# Patient Record
Sex: FEMALE | Race: WHITE
[De-identification: names, ages, dates, MRNs, and addresses within clinical notes are randomized per-mention and may not be internally consistent; named-entity substitution may affect disease eponyms.]

---

## 2019-01-25 ENCOUNTER — HOSPITAL ENCOUNTER (INPATIENT)
Dept: HOSPITAL 92 - SURG A | Age: 84
LOS: 6 days | Discharge: NURSING, REHAB OR LONG-TERM CARE | DRG: 465 | End: 2019-01-31
Attending: ORTHOPAEDIC SURGERY | Admitting: ORTHOPAEDIC SURGERY
Payer: MEDICARE

## 2019-01-25 ENCOUNTER — HOSPITAL ENCOUNTER (OUTPATIENT)
Dept: HOSPITAL 92 - LABBT | Age: 84
Discharge: HOME | End: 2019-01-25
Attending: ORTHOPAEDIC SURGERY
Payer: MEDICARE

## 2019-01-25 VITALS — BODY MASS INDEX: 20 KG/M2

## 2019-01-25 DIAGNOSIS — D53.9: ICD-10-CM

## 2019-01-25 DIAGNOSIS — Z90.10: ICD-10-CM

## 2019-01-25 DIAGNOSIS — I25.10: ICD-10-CM

## 2019-01-25 DIAGNOSIS — T84.52XD: ICD-10-CM

## 2019-01-25 DIAGNOSIS — K21.9: ICD-10-CM

## 2019-01-25 DIAGNOSIS — I48.91: ICD-10-CM

## 2019-01-25 DIAGNOSIS — M06.9: ICD-10-CM

## 2019-01-25 DIAGNOSIS — Z01.812: Primary | ICD-10-CM

## 2019-01-25 DIAGNOSIS — E78.5: ICD-10-CM

## 2019-01-25 DIAGNOSIS — T84.52XA: Primary | ICD-10-CM

## 2019-01-25 DIAGNOSIS — Z85.3: ICD-10-CM

## 2019-01-25 DIAGNOSIS — I10: ICD-10-CM

## 2019-01-25 DIAGNOSIS — F03.90: ICD-10-CM

## 2019-01-25 DIAGNOSIS — Z86.73: ICD-10-CM

## 2019-01-25 DIAGNOSIS — Z74.01: ICD-10-CM

## 2019-01-25 DIAGNOSIS — Z99.3: ICD-10-CM

## 2019-01-25 PROCEDURE — 87076 CULTURE ANAEROBE IDENT EACH: CPT

## 2019-01-25 PROCEDURE — 87077 CULTURE AEROBIC IDENTIFY: CPT

## 2019-01-25 PROCEDURE — 82607 VITAMIN B-12: CPT

## 2019-01-25 PROCEDURE — 86850 RBC ANTIBODY SCREEN: CPT

## 2019-01-25 PROCEDURE — 87070 CULTURE OTHR SPECIMN AEROBIC: CPT

## 2019-01-25 PROCEDURE — C1751 CATH, INF, PER/CENT/MIDLINE: HCPCS

## 2019-01-25 PROCEDURE — P9016 RBC LEUKOCYTES REDUCED: HCPCS

## 2019-01-25 PROCEDURE — 85027 COMPLETE CBC AUTOMATED: CPT

## 2019-01-25 PROCEDURE — 82746 ASSAY OF FOLIC ACID SERUM: CPT

## 2019-01-25 PROCEDURE — 87205 SMEAR GRAM STAIN: CPT

## 2019-01-25 PROCEDURE — 86901 BLOOD TYPING SEROLOGIC RH(D): CPT

## 2019-01-25 PROCEDURE — 80202 ASSAY OF VANCOMYCIN: CPT

## 2019-01-25 PROCEDURE — 86900 BLOOD TYPING SEROLOGIC ABO: CPT

## 2019-01-25 PROCEDURE — 87186 SC STD MICRODIL/AGAR DIL: CPT

## 2019-01-25 PROCEDURE — 36430 TRANSFUSION BLD/BLD COMPNT: CPT

## 2019-01-25 PROCEDURE — 36415 COLL VENOUS BLD VENIPUNCTURE: CPT

## 2019-01-25 PROCEDURE — 36569 INSJ PICC 5 YR+ W/O IMAGING: CPT

## 2019-01-25 NOTE — HP
SCHEDULED DATE OF ADMISSION:  01/28/2019.



HISTORY OF PRESENT ILLNESS:  The patient is an 83-year-old female, who has underwent

hemiarthroplasty of the left hip following a fracture in [place], [place] in July 2018.

 She initially did well.  She subsequently was placed into a memory care facility

because of some dementia.  Apparently, she had wound breakdown with a draining

sinus.  She was seen at [facility] in [place] and apparently had MRSA and was

treated with a brief course of some oral antibiotics.  She subsequently was

transferred to [facility] as her daughter lives here.  She has continued her

local wound care and packing changes, but continues to have pain and drainage from

her left hip.  She has not been febrile or septic.  The patient does have dementia

and has been gradually deteriorating, although according to her daughter, she

usually does recognize her and does have lucid moments, but sometimes is confused.

Since her surgery, she has really not been ambulatory.  She spends most time in bed

or in a wheelchair, although on one or two occasions, she has attempted standing

parallel bars, but essentially, has been bedridden and wheelchair bound over the

past couple of months. 



PAST MEDICAL HISTORY:  As noted above.  The patient has history of rheumatoid

arthritis and has been on low-dose prednisone 5 mg daily.  She also has history of

hypertension, previous breast cancer, atherosclerotic cardiovascular disease. 



CURRENT MEDICATIONS:  Include risperidone, Tylenol, calcium, prednisone, and Colace.



ALLERGIES:  SHE HAS NO KNOWN ALLERGIES.



FAMILY HISTORY:  Otherwise unremarkable.



SOCIAL HISTORY:  Otherwise unremarkable.



REVIEW OF SYSTEMS:  Otherwise unremarkable.



PHYSICAL EXAMINATION:

GENERAL:  Reveals an elderly chronically ill female, who is moderately demented and

will respond occasionally appropriately.  She is afebrile. 

HEENT:  Unremarkable. 

NECK:  Supple. 

CHEST:  Clear. 

HEART:  Regular rate and rhythm. 

ABDOMEN:  Soft, nontender. 

PELVIC:  Deferred. 

RECTAL:  Deferred. 

BREASTS:  Deferred. 

EXTREMITIES:  Pertinent findings of the left hip, the left leg is approximately 2 cm

longer.  There is a healed lateral incision and there is a 2 cm open wound posterior

to this with seropurulent drainage, which is foul smelling with some surrounding

erythema.  There is restricted range of motion and mild pain with range of motion. 

NEUROVASCULAR:  Intact.



DIAGNOSTIC DATA:  X-rays of the left hip reveal an uncemented hemiarthroplasty with

no definite signs of osteomyelitis or vascular calcifications.  The soft tissue

defect and draining sinus area appears to communicate to at least a greater

trochanter. 



IMPRESSION:  Infected hemiarthroplasty of left hip.



PLAN:  Cultures have been obtained from my office are pending.  We will plan

appropriate antibiotic treatment once these are finalized.  We will plan incision

and drainage with the removal of the hemiarthroplasty conversion to a Girdlestone

resection arthroplasty.  I think her age and overall medical status precludes

attempted revision or secondary reimplantation of a new right hip replacement.  The

nature of the surgery, length, recovery, potential complications such as persistent

infection, incomplete relief, delayed wound healing, neurovascular injury,

thromboembolic phenomena, possible transfusion, fractured femur, myocardial

infarction, and death have been discussed in detail with the patient and her

daughter.  Her long-term prognosis is certainly guarded. 







Job ID:  [id number]

## 2019-01-28 PROCEDURE — 30233N1 TRANSFUSION OF NONAUTOLOGOUS RED BLOOD CELLS INTO PERIPHERAL VEIN, PERCUTANEOUS APPROACH: ICD-10-PCS | Performed by: ORTHOPAEDIC SURGERY

## 2019-01-28 PROCEDURE — 0SPB0JZ REMOVAL OF SYNTHETIC SUBSTITUTE FROM LEFT HIP JOINT, OPEN APPROACH: ICD-10-PCS | Performed by: ORTHOPAEDIC SURGERY

## 2019-01-28 PROCEDURE — 0JBM0ZZ EXCISION OF LEFT UPPER LEG SUBCUTANEOUS TISSUE AND FASCIA, OPEN APPROACH: ICD-10-PCS | Performed by: ORTHOPAEDIC SURGERY

## 2019-01-28 RX ADMIN — ASPIRIN 81 MG CHEWABLE TABLET SCH MG: 81 TABLET CHEWABLE at 20:21

## 2019-01-28 RX ADMIN — DOCUSATE SODIUM 50 MG AND SENNOSIDES 8.6 MG SCH TAB: 8.6; 5 TABLET, FILM COATED ORAL at 20:21

## 2019-01-28 NOTE — CON
DATE OF CONSULTATION:  01/28/2019



HISTORY OF PRESENT ILLNESS:  The patient has a left hip arthroplasty infection and

the patient had a fracture in the recent past in July and underwent a

hemiarthroplasty and developed chronic drainage since.  Eventually was transferred

to the [facility] here and has developed worsening cognitive issues related to dementia.

Because of persistence of drainage, Dr. Gerber was consulted and he has just removed

her implant and the patient will remain without an implant.  She is not ambulatory

and has very little cognitive function.  No reported headaches or respiratory

symptoms.  She has a Douglas catheter inserted for the surgery. 



ALLERGIES:  NONE.



FAMILY HISTORY:  Noncontributory.



SOCIAL HISTORY:  Never a smoker.



CURRENT MEDICATIONS:  Include,

1. Aspirin.

2. Cefepime.

3. Vancomycin.



PHYSICAL EXAMINATION:

VITAL SIGNS:  T-max 98.3, blood pressure 130/70, pulse at 94, respirations 16. 

GENERAL:  Appears in no distress.  She has no areas of skin breakdown except for the

surgical site, which does not appear different than usual postop appearance.  The

patient has a peripheral IV access. 

HEENT:  No lymphadenopathy.  Ocular movements, conjugate.  Oral cavity, normal.  She

still has quite a few teeth in place with some dental decay and gum disease. 

NECK:  Supple.  No jugular vein distention. 

LUNGS:  Symmetric, clear breath sounds. 

HEART:  S1, S2.  Regular rate.  No S3 or S4. 

ABDOMEN:  Soft, not distended or tender.  No bladder distention. 

EXTREMITIES:  No joint inflammatory activity.  She is able to move extremities on

command.  Plantar responses are flexor.  Pulses are 1+ in dorsalis pedis.  Trace

edema, lower extremities. 



LABORATORY DATA:  Latest labs from the 24th with white cell count 8.5, hemoglobin

9.3, platelets 386.  Sodium 137, creatinine 0.64.  Ferritin 74.  Liver profile

normal, CRP 7.09, albumin 3.3.  Urinalysis, essentially normal.  Microbiology

results, we have pending 4 samples with moderate wbc's, but no organisms seen.  In

one sample, there were gram-positive cocci in pairs and clusters and also moderate

gram-negative rods as well as some gram-positive rods.  This is from the superficial

abscess pocket, probably there is some contamination there.  The deep samples, one

of them showed gram-positive cocci in pairs and clusters and gram-negative rods. 



ASSESSMENT:  

1. Dementia.

2. Infected hemiarthroplasty following fracture.  The procedure was done elsewhere.

This is a chronically infected wound and the implant has been removed. 



DISCUSSION:  The patient will have IV antimicrobial therapy administered for the

usual duration of 4-6 weeks according to the organisms isolated and susceptibility.

We will try to choose a single-day administration regimen which would be

administered at the [facility] with weekly lab monitoring.  The patient will be at risk

for removing the PICC line because of her cognitive dysfunction.  We will have to

take measures to protect her IV access during the treatment phase.  After that, then

she should be considered cured and no suppressive therapy would be necessary. 







Job ID:  [id number]

## 2019-01-28 NOTE — OP
DATE OF PROCEDURE:  01/28/2019



ASSISTANT:  Curt Murcia MD



ANESTHESIA:  General.



PREOPERATIVE DIAGNOSIS:  Infected hemiarthroplasty, left hip.



POSTOPERATIVE DIAGNOSIS:  Infected hemiarthroplasty, left hip.



PROCEDURE PERFORMED:  Incision and drainage of left hip with removal of infected

hemiarthroplasty (Girdlestone procedure). 



OPERATIVE FINDINGS:  There was a chronic soft tissue abscess superficial to the IT

band, which extended deep to the IT band.  This appeared into the joint.  Most of

the chronic infection appeared to be superficial to this, but there was necrotic

tissue within the joint as well and based on her clinical course, I had to assume

that this was truly infected and elected to remove the hemiarthroplasty and attempt

to get the infection under control.  The stem of the prosthesis was well-fixed

necessitating an osteotomy of the femur to extricate the stem.  There was some

necrotic tissue within the femoral canal, but no gross pus.  Multiple superficial

and deep cultures were obtained prior to beginning antibiotics. 



DESCRIPTION OF PROCEDURE:  After satisfactory anesthesia was induced in supine

position, sequential compression devices were placed on the nonoperative leg

throughout the procedure.  The patient was placed in a lateral decubitus position.

This position held with a bean bag.  She was then prepped and draped in routine

sterile fashion.  The previous lateral incision was reopened in line with the

previous incision, and the posterior draining sinus wound was excised.  Cultures

were taken from the superficial to IT band.  IT band and gluteal fascia were split

in line with the skin incision.  A direct lateral approach to the hip prominence was

accomplished by dividing the anterior third of the gluteus medius and minimus

tendons with Bovie cautery reflecting this as a single flap anteriorly and medially.

 There were abundant calcifications around the capsule of the hip that also would be

consistent with chronic infection.  Deep cultures were obtained from the hip joint.

Hip was dislocated anteriorly.  The bipolar component was removed from the trunnion

of the prosthesis without too much difficulty.  I tried to extricate the stem with a

stem removal device as well as with flexible osteotomes, but cannot get the

prosthesis to move.  I elected to perform an osteotomy of the anterior aspect of the

femur.  Attempt to open this was open bulk, but it did break off the anterior

cortex.  This was excised __________ potential dead bone and infected wound.  After

this was removed, I was able to get flexible osteotomes around the portion of the

prosthesis, removed without too much difficulty.  Wounds were then copiously

irrigated with pulsatile lavage.  All necrotic tissue was sharply excised.  The

abductors were then repaired loosely with interrupted #1 PDS.  IT band and gluteal

fascia were loosely approximated with interrupted #1 PDS.  Subcutaneous tissues were

loosely approximated with 2-0 PDS.  The central necrotic area that may have been

draining and debrided was left open.  The skin was loosely approximated with

interrupted 3-0 nylon.  Saline-soaked sponges were inserted into the open portion of

the wound.  The remaining wound was sterilely dressed, and the patient turned to

supine position, awakened, and taken to recovery room in stable condition.  After

obtaining the wound cultures, she was started on IV Ancef and vancomycin.  The

estimated blood loss was approximately 350 mL.  She received 1 unit of packed cells

intraoperatively.  Her prognosis is guarded.  We will obtain Infectious Disease

consultation for appropriate antibiotics. 







Job ID:  [id number]

## 2019-01-28 NOTE — RAD
LEFT HIP TWO VIEWS:

 

History: Post op. 

 

Comparison: None. 

 

FINDINGS: 

Patient is post hardware removal. The femoral shaft is now laterally displaced in relation to the ace
tabulum. Post-operative changes of the soft tissues are seen. 

 

IMPRESSION: 

Post op hardware removal.

 

POS: [facility]

## 2019-01-29 LAB
HGB BLD-MCNC: 8.8 G/DL (ref 12–16)
MCH RBC QN AUTO: 32.3 PG (ref 27–31)
MCV RBC AUTO: 101 FL (ref 78–98)
PLATELET # BLD AUTO: 444 THOU/UL (ref 130–400)
RBC # BLD AUTO: 2.72 MILL/UL (ref 4.2–5.4)
VIT B12 SERPL-MCNC: 405 PG/ML (ref 211–911)
WBC # BLD AUTO: 13.2 THOU/UL (ref 4.8–10.8)

## 2019-01-29 PROCEDURE — 02HV33Z INSERTION OF INFUSION DEVICE INTO SUPERIOR VENA CAVA, PERCUTANEOUS APPROACH: ICD-10-PCS | Performed by: RADIOLOGY

## 2019-01-29 PROCEDURE — B548ZZA ULTRASONOGRAPHY OF SUPERIOR VENA CAVA, GUIDANCE: ICD-10-PCS | Performed by: RADIOLOGY

## 2019-01-29 RX ADMIN — DOCUSATE SODIUM 50 MG AND SENNOSIDES 8.6 MG SCH TAB: 8.6; 5 TABLET, FILM COATED ORAL at 08:29

## 2019-01-29 RX ADMIN — ASPIRIN 81 MG CHEWABLE TABLET SCH MG: 81 TABLET CHEWABLE at 08:29

## 2019-01-29 RX ADMIN — VANCOMYCIN HYDROCHLORIDE SCH MLS: 1 INJECTION, SOLUTION INTRAVENOUS at 11:31

## 2019-01-29 RX ADMIN — DOCUSATE SODIUM 50 MG AND SENNOSIDES 8.6 MG SCH TAB: 8.6; 5 TABLET, FILM COATED ORAL at 21:25

## 2019-01-29 RX ADMIN — Medication SCH TAB: at 08:30

## 2019-01-29 RX ADMIN — CYANOCOBALAMIN TAB 1000 MCG SCH MCG: 1000 TAB at 08:30

## 2019-01-29 RX ADMIN — VANCOMYCIN HYDROCHLORIDE SCH MLS: 1 INJECTION, SOLUTION INTRAVENOUS at 00:00

## 2019-01-29 RX ADMIN — ASPIRIN 81 MG CHEWABLE TABLET SCH MG: 81 TABLET CHEWABLE at 21:23

## 2019-01-29 RX ADMIN — VANCOMYCIN HYDROCHLORIDE SCH MLS: 1 INJECTION, SOLUTION INTRAVENOUS at 23:12

## 2019-01-29 RX ADMIN — MULTIPLE VITAMINS W/ MINERALS TAB SCH TAB: TAB at 08:29

## 2019-01-29 RX ADMIN — Medication PRN ML: at 21:25

## 2019-01-29 NOTE — PDOC.PN
- Subjective


Encounter Start Date: 01/29/19


Encounter Start Time: 09:40


Subjective: Not communicative this morning. No event overnight.





- Objective


MAR Reviewed: Yes


Vital Signs & Weight: 


 Vital Signs (12 hours)











  Temp Pulse Resp BP Pulse Ox


 


 01/29/19 04:00  98.9 F  100  16  104/65  94 L


 


 01/29/19 00:00  98.8 F  104 H  18  107/61  94 L








 Weight











Weight                         132 lb














I&O: 


 











 01/28/19 01/29/19 01/30/19





 06:59 06:59 06:59


 


Intake Total  930.5 


 


Output Total  825 


 


Balance  105.5 











Result Diagrams: 


 01/29/19 07:30








Phys Exam





- Physical Examination


Constitutional: NAD


HEENT: moist MMs


Respiratory: no wheezing, no rales, no rhonchi


Cardiovascular: RRR, no significant murmur


Gastrointestinal: soft, positive bowel sounds


left hip wound with dressing c/d/i


Deviation from normal: sleeping





Dx/Plan


(1) Infected prosthetic hip


Code(s): T84.59XA - INFECT/INFLM REACTION DUE TO OTH INTERNAL JOINT PROSTH, INIT

; Z96.649 - PRESENCE OF UNSPECIFIED ARTIFICIAL HIP JOINT   Status: Acute   

Comment: s/p I&D and hardware removal, will need 4-6 weeks of IV antibiotics, 

awaiting surgical culture   





(2) Hypertension


Code(s): I10 - ESSENTIAL (PRIMARY) HYPERTENSION   Status: Chronic   


Qualifiers: 


   Hypertension type: essential hypertension   Qualified Code(s): I10 - 

Essential (primary) hypertension   


Comment: by history, not requiring any medications   





(3) CAD (coronary artery disease)


Code(s): I25.10 - ATHSCL HEART DISEASE OF NATIVE CORONARY ARTERY W/O ANG PCTRS 

  Status: Chronic   Comment: stable, asymptomatic   





(4) Rheumatoid arthritis


Code(s): M06.9 - RHEUMATOID ARTHRITIS, UNSPECIFIED   Status: Chronic   Comment: 

continuing prednisone   





(5) Macrocytic anemia


Code(s): D53.9 - NUTRITIONAL ANEMIA, UNSPECIFIED   Status: Chronic   Comment: 

check vitamin B12 and folic acid levels   





- Plan


cont current plan of care, PT/OT, DVT proph w/SCDs





* .








- Discharge Day


Encounter end time: 09:50

## 2019-01-29 NOTE — SPC
ULTRASOUND GUIDED LEFT UPPER EXTREMITY PICC LINE PLACEMENT: 

1/29/19

 

HISTORY: 

Left hip infection. Patient needs longterm IV antibiotics. 

 

FLUOROSCOPY:

Total fluoroscopy time is 0.2 minutes. Total dose of 845 mGy*cm2. 

 

TECHNIQUE:  

After informed consent was obtained, patient was placed on the angiography table in the supine positi
on. The left upper extremity was meticulously prepped and draped in the usual sterile fashion. An shivani
ropriate access site was determined with ultrasound guidance. 

 

Skin and subcutaneous tissues were infiltrated with buffered 1% lidocaine for local anesthesia at T10
 the puncture site. The left basilic vein was accessed using micropuncture technique and concurrent r
eal time ultrasound guidance. The catheter was measured and cut to the appropriate length. The cathet
er was placed over the guide wire and tip was positioned overlying the superior SVC. Guide wire and p
eel away sheath were removed. Catheter was inadvertently trimmed shorter than desired, but the tip do
es overlie the proximal SVC, and the catheter is easily aspirated and flushed. Catheter is secured in
 place utilizing dry sterile dressing. 

 

Patient tolerated the procedure well without immediate complication.

 

FINDINGS:  

Technically successful placement of a single lumen 5 Maori 34 cm PICC line via the left basilic vein
. Tip of the catheter overlies the proximal SVC. 

 

IMPRESSION:  

Technically successful left upper extremity PICC line placement. 

 

POS: [facility]

## 2019-01-29 NOTE — CON
DATE OF CONSULTATION:  01/28/2019



PRIMARY CARE PHYSICIAN:  Dr. Ibarra.



PRIMARY TEAM:  Orthopedics, Dr. Gerber.



REASON FOR CONSULTATION:  Medical management.



HISTORY OF PRESENT ILLNESS:  This is an 83-year-old white female with a history 
of

dementia, progressive since a hip replacement 6 months ago.  She had a chronic

draining wound from the hip and has been diagnosed with an infected hip hardware
,

was brought in by Dr. Gerber today for I and D of the left hip and removal of

infected hardware.  She has been living in a nursing home since the hip 
replacement

and has not been ambulatory at any extent.  She has no history of fevers or 
other

symptoms besides the draining wound.  The patient does have dementia and is not 
able

to give any history, so all history is taken from the chart. 



PAST MEDICAL HISTORY:  

1. Dementia.

2. Rheumatoid arthritis, on chronic steroids.

3. Coronary artery disease.

4. Breast cancer, status post mastectomy.

5. Gastroesophageal reflux disease.

6. Hypertension.

7. TIA.

8. Atrial fibrillation.

9. Dyslipidemia.



PAST SURGICAL HISTORY:  

1. Mastectomy.

2. Tonsillectomy.

3. Left total hip arthroplasty in July of 2018 with an MRSA hip infection, 
treated

at outside hospital in December 2018. 



FAMILY HISTORY:  No known family history.



SOCIAL HISTORY:  No known history of tobacco, alcohol or illicit drug use.  She

lives in a nursing home. 



ALLERGIES:  NO KNOWN DRUG ALLERGIES.



CURRENT MEDICATIONS:  

1. Acetaminophen 650 mg every 4 hours as needed for pain.

2. Calcium plus vitamin D 400 one tablet daily.

3. Vitamin B12 1000 mcg daily.

4. Lorazepam 0.5 mg twice a day.

5. Prednisone 5 mg daily.

6. Risperdal 0.5 mg twice a day.

7. Sertraline 50 mg daily.



REVIEW OF SYSTEMS:  Unable to obtain secondary to the patient's dementia.  She 
just

reports feeling bad, but cannot specify if she hurts anywhere or any specific

complaints. 



PHYSICAL EXAMINATION:

VITAL SIGNS:  Blood pressure 138/78, pulse 94, respirations 16, temperature 98.3
,

and O2 saturation 97% on room air. 

GENERAL:  This is a well-developed and well-nourished white female, who is alert
,

but does not say much. 

HEENT:  Pupils equal, round, and reactive to light.  Oropharynx is clear without

lesions, erythema or exudate. 

NECK:  Supple.  No lymphadenopathy.  No thyroid nodules or enlargement.  No 
JVD. 

HEART:  Regular rate and rhythm.  No murmurs, rubs or gallops. 

LUNGS:  Clear to auscultation bilaterally.  No wheezes, crackles or rhonchi. 

ABDOMEN:  Soft and nontender to palpation.  Normoactive bowel sounds.  No

hepatosplenomegaly or other masses. 

EXTREMITIES:  She has a dressing over the left hip.  Surgical wound is clean, 
dry,

and intact.  Otherwise, no injuries noted to her extremities.  No clubbing, 
cyanosis

or edema.  Good peripheral pulses. 

SKIN:  No rashes or lesions noted. 

NEUROLOGIC:  The patient is able to wiggle her toes on both sides and move her 
arms

normally to command.  Speech she has is clear. 

PSYCHIATRIC:  She is alert and oriented to person only, does not know why she is

here, does not know where she is at, and cannot give the date. 



LABORATORY DATA:  Lab done 3 days ago preoperatively.  CBC with a white blood 
cell

count of 8.5, hemoglobin of 9.3, hematocrit of 28.9, and MCV of 106.  Basic

metabolic panel is normal.  C-reactive protein was elevated at 7.09.  
Urinalysis was

negative for evidence of infection.  She had blood cultures drawn on the 24th, 4

days ago.  There was no growth at 48 hours.  Her bacterial culture taken from 
the

hip wound on the 23rd shows Citrobacter freundii and Klebsiella pneumoniae.  No

evidence of MRSA at this point.  Cultures were drawn today during the I and D 
of the

hip, those are pending. 



ASSESSMENT:  

1. Infected left hip replacement, now status post removal of hardware.

2. History of hypertension, currently well controlled and not currently 
requiring

any blood pressure medicines at the nursing home.  We will keep an eye on her 
blood

pressure while she is in-house. 

3. History of coronary artery disease without any active symptoms or evidence of

active disease.  We will monitor. 

4. Rheumatoid arthritis.  We will continue the patient's prednisone.

5. GERD.  We will put the patient on Pepcid twice a day.

6. History of atrial fibrillation, currently in normal sinus rhythm by her EKG.

7. Code status:  The patient is reportedly a full code.







Job ID:  [id number]



[facility]

## 2019-01-30 LAB
HGB BLD-MCNC: 9.9 G/DL (ref 12–16)
MCH RBC QN AUTO: 32.9 PG (ref 27–31)
MCV RBC AUTO: 101 FL (ref 78–98)
PLATELET # BLD AUTO: 373 THOU/UL (ref 130–400)
RBC # BLD AUTO: 3.02 MILL/UL (ref 4.2–5.4)
VANCOMYCIN TROUGH SERPL-MCNC: 24.1 UG/ML
WBC # BLD AUTO: 16.7 THOU/UL (ref 4.8–10.8)

## 2019-01-30 RX ADMIN — Medication SCH TAB: at 09:33

## 2019-01-30 RX ADMIN — ASPIRIN 81 MG CHEWABLE TABLET SCH MG: 81 TABLET CHEWABLE at 09:34

## 2019-01-30 RX ADMIN — MULTIPLE VITAMINS W/ MINERALS TAB SCH TAB: TAB at 09:33

## 2019-01-30 RX ADMIN — DOCUSATE SODIUM 50 MG AND SENNOSIDES 8.6 MG SCH TAB: 8.6; 5 TABLET, FILM COATED ORAL at 09:34

## 2019-01-30 RX ADMIN — ASPIRIN 81 MG CHEWABLE TABLET SCH MG: 81 TABLET CHEWABLE at 22:46

## 2019-01-30 RX ADMIN — Medication PRN ML: at 23:01

## 2019-01-30 RX ADMIN — VANCOMYCIN HYDROCHLORIDE SCH MLS: 1 INJECTION, SOLUTION INTRAVENOUS at 11:56

## 2019-01-30 RX ADMIN — DOCUSATE SODIUM 50 MG AND SENNOSIDES 8.6 MG SCH TAB: 8.6; 5 TABLET, FILM COATED ORAL at 22:47

## 2019-01-30 RX ADMIN — CYANOCOBALAMIN TAB 1000 MCG SCH MCG: 1000 TAB at 09:35

## 2019-01-30 RX ADMIN — VANCOMYCIN HYDROCHLORIDE SCH MLS: 1 INJECTION, SOLUTION INTRAVENOUS at 23:00

## 2019-01-30 NOTE — PDOC.PN
- Subjective


Encounter Start Date: 01/30/19


Encounter Start Time: 10:10


Subjective: Patient without complaint. Sleeping but responded with "Good morning

"
-: when I greeted her.





- Objective


MAR Reviewed: Yes


Vital Signs & Weight: 


 Vital Signs (12 hours)











  Pulse Ox


 


 01/29/19 20:00  92 L








 Weight











Admit Weight                   132 lb


 


Weight                         132 lb














I&O: 


 











 01/29/19 01/30/19 01/31/19





 06:59 06:59 06:59


 


Intake Total 930.5 850 


 


Output Total 825 925 


 


Balance 105.5 -75 











Result Diagrams: 


 01/30/19 06:01








Phys Exam





- Physical Examination


Constitutional: NAD


HEENT: moist MMs


Respiratory: no wheezing, no rales, no rhonchi


Cardiovascular: RRR


Gastrointestinal: soft, positive bowel sounds


Musculoskeletal: no edema


Deviation from normal: sleeping, didn't open eyes when I talked to her but did 

say she was fine





Dx/Plan


(1) Infected prosthetic hip


Code(s): T84.59XA - INFECT/INFLM REACTION DUE TO OTH INTERNAL JOINT PROSTH, INIT

; Z96.649 - PRESENCE OF UNSPECIFIED ARTIFICIAL HIP JOINT   Status: Acute   

Comment: s/p I&D and hardware removal, will need 4-6 weeks of IV antibiotics, 

PICC line placed, awaiting surgical culture   





(2) Hypertension


Code(s): I10 - ESSENTIAL (PRIMARY) HYPERTENSION   Status: Chronic   


Qualifiers: 


   Hypertension type: essential hypertension   Qualified Code(s): I10 - 

Essential (primary) hypertension   


Comment: by history, not requiring any medications   





(3) CAD (coronary artery disease)


Code(s): I25.10 - ATHSCL HEART DISEASE OF NATIVE CORONARY ARTERY W/O ANG PCTRS 

  Status: Chronic   Comment: stable, asymptomatic   





(4) Rheumatoid arthritis


Code(s): M06.9 - RHEUMATOID ARTHRITIS, UNSPECIFIED   Status: Chronic   Comment: 

continuing prednisone   





(5) Macrocytic anemia


Code(s): D53.9 - NUTRITIONAL ANEMIA, UNSPECIFIED   Status: Chronic   Comment: 

folic acid level on lower end of normal, start supplementation   





- Plan


cont current plan of care, PT/OT, DVT proph w/SCDs





* .








- Discharge Day


Encounter end time: 10:20

## 2019-01-31 VITALS — SYSTOLIC BLOOD PRESSURE: 127 MMHG | TEMPERATURE: 98.7 F | DIASTOLIC BLOOD PRESSURE: 77 MMHG

## 2019-01-31 RX ADMIN — Medication SCH TAB: at 08:36

## 2019-01-31 RX ADMIN — ASPIRIN 81 MG CHEWABLE TABLET SCH MG: 81 TABLET CHEWABLE at 08:36

## 2019-01-31 RX ADMIN — DOCUSATE SODIUM 50 MG AND SENNOSIDES 8.6 MG SCH TAB: 8.6; 5 TABLET, FILM COATED ORAL at 08:37

## 2019-01-31 RX ADMIN — MULTIPLE VITAMINS W/ MINERALS TAB SCH TAB: TAB at 08:37

## 2019-01-31 RX ADMIN — CYANOCOBALAMIN TAB 1000 MCG SCH MCG: 1000 TAB at 08:37

## 2019-01-31 RX ADMIN — VANCOMYCIN HYDROCHLORIDE SCH MLS: 1 INJECTION, SOLUTION INTRAVENOUS at 10:21

## 2019-01-31 NOTE — PDOC.PN
- Subjective


Encounter Start Date: 01/31/19


Encounter Start Time: 12:50


-: non-verbal


Subjective: Patient sleeping, not talking this morning. No events overnight.





- Objective


MAR Reviewed: Yes


Vital Signs & Weight: 


 Vital Signs (12 hours)











  Temp Pulse Resp BP Pulse Ox


 


 01/31/19 08:43  99.3 F  95  19  113/72  95


 


 01/31/19 04:36  98.6 F  91  17  173/90 H  96


 


 01/31/19 00:23  98.6 F  83  17  116/68  95


 


 01/30/19 21:35  98.5 F  82  18  112/64  95








 Weight











Admit Weight                   132 lb


 


Weight                         132 lb














I&O: 


 











 01/30/19 01/31/19 02/01/19





 06:59 06:59 06:59


 


Intake Total 850 940 


 


Output Total 925 200 


 


Balance -75 740 











Result Diagrams: 


 01/30/19 06:01








Phys Exam





- Physical Examination


Constitutional: NAD


HEENT: moist MMs


Respiratory: no wheezing, no rales, no rhonchi


Cardiovascular: RRR


Gastrointestinal: soft, non-tender, positive bowel sounds


Musculoskeletal: no edema


Deviation from normal: sleeping, not arousable today though did scratch her nose





Dx/Plan


(1) Infected prosthetic hip


Code(s): T84.59XA - INFECT/INFLM REACTION DUE TO OTH INTERNAL JOINT PROSTH, INIT

; Z96.649 - PRESENCE OF UNSPECIFIED ARTIFICIAL HIP JOINT   Status: Acute   

Comment: s/p I&D and hardware removal, will need 4-6 weeks of IV antibiotics, 

PICC line placed, awaiting surgical culture, growing gram neg rods   





(2) Hypertension


Code(s): I10 - ESSENTIAL (PRIMARY) HYPERTENSION   Status: Chronic   


Qualifiers: 


   Hypertension type: essential hypertension   Qualified Code(s): I10 - 

Essential (primary) hypertension   


Comment: by history, not requiring any medications   





(3) CAD (coronary artery disease)


Code(s): I25.10 - ATHSCL HEART DISEASE OF NATIVE CORONARY ARTERY W/O ANG PCTRS 

  Status: Chronic   Comment: stable, asymptomatic   





(4) Rheumatoid arthritis


Code(s): M06.9 - RHEUMATOID ARTHRITIS, UNSPECIFIED   Status: Chronic   Comment: 

continuing prednisone   





(5) Macrocytic anemia


Code(s): D53.9 - NUTRITIONAL ANEMIA, UNSPECIFIED   Status: Chronic   Comment: 

folic acid level on lower end of normal, start supplementation   





- Plan


cont current plan of care, continue antibiotics


Can d/c to SNF on IV abx once ok with ID





* .








- Discharge Day


Encounter end time: 13:00

## 2019-12-01 ENCOUNTER — HOSPITAL ENCOUNTER (EMERGENCY)
Dept: HOSPITAL 92 - ERS | Age: 84
Discharge: HOME | End: 2019-12-01
Payer: MEDICARE
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
## 2019-12-01 DIAGNOSIS — S80.01XA: Primary | ICD-10-CM

## 2019-12-01 DIAGNOSIS — I25.2: ICD-10-CM

## 2019-12-01 DIAGNOSIS — Z79.899: ICD-10-CM

## 2019-12-01 DIAGNOSIS — F03.90: ICD-10-CM

## 2019-12-01 NOTE — RAD
RIGHT HIP 2 VIEWS:

 

INDICATION: 

History of fall with right hip and right knee pain.

 

FINDINGS: 

There is diffuse osteopenia.  There is moderate degenerative change of the right hip.  There are vasc
ular calcifications within the adjacent soft tissues.

 

IMPRESSION: 

No acute osseous abnormality.

 

POS: [facility]

## 2019-12-01 NOTE — RAD
RIGHT KNEE 4 VIEWS:

 

INDICATION: 

Fell out of bed with right knee pain.

 

COMPARISON: 

None.

 

FINDINGS:  

No acute fracture or subluxation is evident.  There is diffuse osteopenia.  There are vascular calcif
ications within the soft tissues.  There is mild osteoarthrosis of the right knee.

 

IMPRESSION: 

No acute osseous abnormality.

 

POS: BH